# Patient Record
Sex: FEMALE | Race: WHITE | NOT HISPANIC OR LATINO | Employment: UNEMPLOYED | URBAN - NONMETROPOLITAN AREA
[De-identification: names, ages, dates, MRNs, and addresses within clinical notes are randomized per-mention and may not be internally consistent; named-entity substitution may affect disease eponyms.]

---

## 2019-11-14 ENCOUNTER — TRANSCRIBE ORDERS (OUTPATIENT)
Dept: ADMINISTRATIVE | Facility: HOSPITAL | Age: 44
End: 2019-11-14

## 2019-11-14 DIAGNOSIS — B18.2 HEPATITIS C CARRIER (HCC): ICD-10-CM

## 2022-12-11 ENCOUNTER — HOSPITAL ENCOUNTER (EMERGENCY)
Facility: HOSPITAL | Age: 47
Discharge: HOME | End: 2022-12-11
Attending: STUDENT IN AN ORGANIZED HEALTH CARE EDUCATION/TRAINING PROGRAM | Admitting: STUDENT IN AN ORGANIZED HEALTH CARE EDUCATION/TRAINING PROGRAM

## 2022-12-11 ENCOUNTER — APPOINTMENT (OUTPATIENT)
Dept: CT IMAGING | Facility: HOSPITAL | Age: 47
End: 2022-12-11

## 2022-12-11 VITALS
SYSTOLIC BLOOD PRESSURE: 139 MMHG | OXYGEN SATURATION: 99 % | DIASTOLIC BLOOD PRESSURE: 91 MMHG | RESPIRATION RATE: 14 BRPM | BODY MASS INDEX: 23.32 KG/M2 | HEART RATE: 108 BPM | WEIGHT: 140 LBS | HEIGHT: 65 IN | TEMPERATURE: 98.9 F

## 2022-12-11 DIAGNOSIS — N20.1 URETEROLITHIASIS: Primary | ICD-10-CM

## 2022-12-11 LAB
ALBUMIN SERPL-MCNC: 3.9 G/DL (ref 3.5–5.2)
ALBUMIN/GLOB SERPL: 1 G/DL
ALP SERPL-CCNC: 109 U/L (ref 39–117)
ALT SERPL W P-5'-P-CCNC: 11 U/L (ref 1–33)
ANION GAP SERPL CALCULATED.3IONS-SCNC: 10 MMOL/L (ref 5–15)
AST SERPL-CCNC: 12 U/L (ref 1–32)
B-HCG UR QL: NEGATIVE
BACTERIA UR QL AUTO: ABNORMAL /HPF
BASOPHILS # BLD AUTO: 0.07 10*3/MM3 (ref 0–0.2)
BASOPHILS NFR BLD AUTO: 0.5 % (ref 0–1.5)
BILIRUB SERPL-MCNC: <0.2 MG/DL (ref 0–1.2)
BILIRUB UR QL STRIP: NEGATIVE
BUN SERPL-MCNC: 14 MG/DL (ref 6–20)
BUN/CREAT SERPL: 14.9 (ref 7–25)
CALCIUM SPEC-SCNC: 9.6 MG/DL (ref 8.6–10.5)
CHLORIDE SERPL-SCNC: 100 MMOL/L (ref 98–107)
CLARITY UR: ABNORMAL
CO2 SERPL-SCNC: 27 MMOL/L (ref 22–29)
COLOR UR: ABNORMAL
CREAT SERPL-MCNC: 0.94 MG/DL (ref 0.57–1)
DEPRECATED RDW RBC AUTO: 48.3 FL (ref 37–54)
EGFRCR SERPLBLD CKD-EPI 2021: 76.4 ML/MIN/1.73
EOSINOPHIL # BLD AUTO: 0.22 10*3/MM3 (ref 0–0.4)
EOSINOPHIL NFR BLD AUTO: 1.6 % (ref 0.3–6.2)
ERYTHROCYTE [DISTWIDTH] IN BLOOD BY AUTOMATED COUNT: 13.5 % (ref 12.3–15.4)
GLOBULIN UR ELPH-MCNC: 3.9 GM/DL
GLUCOSE SERPL-MCNC: 89 MG/DL (ref 65–99)
GLUCOSE UR STRIP-MCNC: NEGATIVE MG/DL
HCT VFR BLD AUTO: 42 % (ref 34–46.6)
HGB BLD-MCNC: 13.3 G/DL (ref 12–15.9)
HGB UR QL STRIP.AUTO: ABNORMAL
HOLD SPECIMEN: NORMAL
HYALINE CASTS UR QL AUTO: ABNORMAL /LPF
IMM GRANULOCYTES # BLD AUTO: 0.11 10*3/MM3 (ref 0–0.05)
IMM GRANULOCYTES NFR BLD AUTO: 0.8 % (ref 0–0.5)
KETONES UR QL STRIP: ABNORMAL
LEUKOCYTE ESTERASE UR QL STRIP.AUTO: ABNORMAL
LIPASE SERPL-CCNC: 23 U/L (ref 13–60)
LYMPHOCYTES # BLD AUTO: 2.55 10*3/MM3 (ref 0.7–3.1)
LYMPHOCYTES NFR BLD AUTO: 18.7 % (ref 19.6–45.3)
MCH RBC QN AUTO: 30.6 PG (ref 26.6–33)
MCHC RBC AUTO-ENTMCNC: 31.7 G/DL (ref 31.5–35.7)
MCV RBC AUTO: 96.6 FL (ref 79–97)
MONOCYTES # BLD AUTO: 1 10*3/MM3 (ref 0.1–0.9)
MONOCYTES NFR BLD AUTO: 7.3 % (ref 5–12)
MUCOUS THREADS URNS QL MICRO: ABNORMAL /HPF
NEUTROPHILS NFR BLD AUTO: 71.1 % (ref 42.7–76)
NEUTROPHILS NFR BLD AUTO: 9.66 10*3/MM3 (ref 1.7–7)
NITRITE UR QL STRIP: NEGATIVE
NRBC BLD AUTO-RTO: 0 /100 WBC (ref 0–0.2)
PH UR STRIP.AUTO: 5.5 [PH] (ref 5–8)
PLATELET # BLD AUTO: 624 10*3/MM3 (ref 140–450)
PMV BLD AUTO: 9.2 FL (ref 6–12)
POTASSIUM SERPL-SCNC: 4.3 MMOL/L (ref 3.5–5.2)
PROT SERPL-MCNC: 7.8 G/DL (ref 6–8.5)
PROT UR QL STRIP: ABNORMAL
RBC # BLD AUTO: 4.35 10*6/MM3 (ref 3.77–5.28)
RBC # UR STRIP: ABNORMAL /HPF
REF LAB TEST METHOD: ABNORMAL
SODIUM SERPL-SCNC: 137 MMOL/L (ref 136–145)
SP GR UR STRIP: >1.03 (ref 1–1.03)
SQUAMOUS #/AREA URNS HPF: ABNORMAL /HPF
UROBILINOGEN UR QL STRIP: ABNORMAL
WBC # UR STRIP: ABNORMAL /HPF
WBC NRBC COR # BLD: 13.61 10*3/MM3 (ref 3.4–10.8)
WHOLE BLOOD HOLD COAG: NORMAL
WHOLE BLOOD HOLD SPECIMEN: NORMAL

## 2022-12-11 PROCEDURE — 81003 URINALYSIS AUTO W/O SCOPE: CPT | Performed by: STUDENT IN AN ORGANIZED HEALTH CARE EDUCATION/TRAINING PROGRAM

## 2022-12-11 PROCEDURE — 81001 URINALYSIS AUTO W/SCOPE: CPT | Performed by: STUDENT IN AN ORGANIZED HEALTH CARE EDUCATION/TRAINING PROGRAM

## 2022-12-11 PROCEDURE — 83690 ASSAY OF LIPASE: CPT | Performed by: STUDENT IN AN ORGANIZED HEALTH CARE EDUCATION/TRAINING PROGRAM

## 2022-12-11 PROCEDURE — 80053 COMPREHEN METABOLIC PANEL: CPT | Performed by: STUDENT IN AN ORGANIZED HEALTH CARE EDUCATION/TRAINING PROGRAM

## 2022-12-11 PROCEDURE — 36415 COLL VENOUS BLD VENIPUNCTURE: CPT

## 2022-12-11 PROCEDURE — 81025 URINE PREGNANCY TEST: CPT | Performed by: STUDENT IN AN ORGANIZED HEALTH CARE EDUCATION/TRAINING PROGRAM

## 2022-12-11 PROCEDURE — 99284 EMERGENCY DEPT VISIT MOD MDM: CPT

## 2022-12-11 PROCEDURE — 85025 COMPLETE CBC W/AUTO DIFF WBC: CPT | Performed by: STUDENT IN AN ORGANIZED HEALTH CARE EDUCATION/TRAINING PROGRAM

## 2022-12-11 RX ORDER — SODIUM CHLORIDE 0.9 % (FLUSH) 0.9 %
10 SYRINGE (ML) INJECTION AS NEEDED
Status: DISCONTINUED | OUTPATIENT
Start: 2022-12-11 | End: 2022-12-11 | Stop reason: HOSPADM

## 2022-12-11 RX ORDER — KETOROLAC TROMETHAMINE 10 MG/1
10 TABLET, FILM COATED ORAL EVERY 8 HOURS PRN
Qty: 15 TABLET | Refills: 0 | Status: SHIPPED | OUTPATIENT
Start: 2022-12-11 | End: 2022-12-16

## 2022-12-11 RX ORDER — ONDANSETRON 4 MG/1
4 TABLET, FILM COATED ORAL EVERY 8 HOURS PRN
COMMUNITY

## 2022-12-11 RX ORDER — TAMSULOSIN HYDROCHLORIDE 0.4 MG/1
1 CAPSULE ORAL DAILY
Qty: 30 CAPSULE | Refills: 0 | Status: SHIPPED | OUTPATIENT
Start: 2022-12-11

## 2022-12-11 RX ORDER — KETOROLAC TROMETHAMINE 10 MG/1
10 TABLET, FILM COATED ORAL ONCE
Status: COMPLETED | OUTPATIENT
Start: 2022-12-11 | End: 2022-12-11

## 2022-12-11 RX ORDER — DOLUTEGRAVIR SODIUM 50 MG/1
50 TABLET, FILM COATED ORAL
COMMUNITY

## 2022-12-11 RX ADMIN — KETOROLAC TROMETHAMINE 10 MG: 10 TABLET, FILM COATED ORAL at 14:59

## 2022-12-11 NOTE — ED PROVIDER NOTES
Subjective   History of Present Illness   Patient presents with flank and abdominal pain.  Present in the right flank and right lower abdomen.  Has been present for about 2 weeks.  She was seen at outside hospital where she was diagnosed with urinary tract infection and a large kidney stone and advised to follow-up outpatient.  She has not been able to get in with the urologist.  She has been taking her antibiotic as prescribed.  She no longer has any fevers.  She does feel the pain is migrated down the right side of her body.  No lightheadedness or syncope.  She is taking Tylenol with some relief.  Still urinating normally with no blood.    Review of Systems   Constitutional: Negative for chills and fever.   HENT: Negative for congestion and sore throat.    Eyes: Negative for pain and redness.   Respiratory: Negative for cough and shortness of breath.    Cardiovascular: Negative for chest pain and palpitations.   Gastrointestinal: Positive for abdominal pain. Negative for vomiting.   Genitourinary: Positive for flank pain. Negative for difficulty urinating.   Musculoskeletal: Negative for gait problem and joint swelling.   Skin: Negative for rash and wound.   Neurological: Negative for syncope and light-headedness.       Past Medical History:   Diagnosis Date   • HIV disease (HCC)        No Known Allergies    Past Surgical History:   Procedure Laterality Date   •  SECTION     • ENDOSCOPY  2006    gastritis       History reviewed. No pertinent family history.    Social History     Socioeconomic History   • Marital status:    Tobacco Use   • Smoking status: Never   • Smokeless tobacco: Never   Substance and Sexual Activity   • Alcohol use: Never   • Drug use: Never           Objective   Physical Exam  Vitals reviewed.   Constitutional:       General: She is not in acute distress.  HENT:      Head: Normocephalic and atraumatic.   Eyes:      Extraocular Movements: Extraocular movements intact.       Conjunctiva/sclera: Conjunctivae normal.   Cardiovascular:      Pulses: Normal pulses.      Heart sounds: Normal heart sounds.   Pulmonary:      Effort: Pulmonary effort is normal. No respiratory distress.   Abdominal:      General: Abdomen is flat. There is no distension.      Tenderness: There is no abdominal tenderness.      Comments: No CVA tenderness to percussion.   Musculoskeletal:      Cervical back: Normal range of motion and neck supple.   Skin:     General: Skin is warm and dry.   Neurological:      General: No focal deficit present.      Mental Status: She is alert. Mental status is at baseline.   Psychiatric:         Behavior: Behavior normal.         Thought Content: Thought content normal.         Procedures           ED Course                                           MDM   Susannah Garcia is a 47 y.o. female with PMH above who presents to the Emergency Department with flank/abdominal pain.  Previously diagnosed ureterolithiasis, history and physical is consistent.  Advised the patient that we can get labs and repeat CT to see if the stone is migrated, measure the size, reevaluate for hydronephrosis.  Laboratory studies obtained and are overall unremarkable without signs of infection; additionally the patient is afebrile.  Sepsis or bacteremia or infected stone are less likely at this time.  Patient had pain improvement with Toradol.  Unfortunately, she was ultimately unwilling to get the CT because it took a little bit longer than expected and she had to leave.  I do not think that this CT would be absolutely required given her history and physical; she is tolerating p.o., has a very low likelihood of other emergent intra-abdominal process such as ectopic pregnancy, appendicitis, cholecystitis, or other emergency.  Therefore we will treat outpatient with Toradol and Flomax, she agrees to strict return precautions, she received urology follow-up information and agrees to follow-up soon as possible.      Final diagnosis: renal colic    All questions answered. Patient/family was understanding and in agreement with today's assessment and plan. The patient was monitored during their stay in the ED and dispositioned without acute event.    Electronically signed by:  Nico Dunn MD 12/11/2022 17:03 CST      Note: Dragon medical dictation software was used in the creation of this note.      Final diagnoses:   Ureterolithiasis       ED Disposition  ED Disposition     ED Disposition   Discharge    Condition   Stable    Comment   --             [facility] [address], [address]  [facility] [address] [phone]             Medication List      New Prescriptions    ketorolac 10 MG tablet  Commonly known as: TORADOL  Take 1 tablet by mouth Every 8 (Eight) Hours As Needed for Moderate Pain for up to 5 days.     tamsulosin 0.4 MG capsule 24 hr capsule  Commonly known as: FLOMAX  Take 1 capsule by mouth Daily.           Where to Get Your Medications      These medications were sent to [facility] - [place], [place] - [address] [place] [address] - [phone]  - [phone]  [address] [place] [address]    Phone: [phone]   · ketorolac 10 MG tablet  · tamsulosin 0.4 MG capsule 24 hr capsule          Nico Dunn MD  12/11/22 4386

## 2023-01-05 ENCOUNTER — APPOINTMENT (OUTPATIENT)
Dept: CT IMAGING | Age: 48
End: 2023-01-05
Payer: MEDICAID

## 2023-01-05 ENCOUNTER — HOSPITAL ENCOUNTER (EMERGENCY)
Age: 48
Discharge: HOME | End: 2023-01-06
Attending: EMERGENCY MEDICINE
Payer: MEDICAID

## 2023-01-05 DIAGNOSIS — N20.0 KIDNEY STONE: ICD-10-CM

## 2023-01-05 DIAGNOSIS — N39.0 URINARY TRACT INFECTION WITHOUT HEMATURIA, SITE UNSPECIFIED: Primary | ICD-10-CM

## 2023-01-05 DIAGNOSIS — R10.9 RIGHT FLANK PAIN: ICD-10-CM

## 2023-01-05 LAB
ALBUMIN SERPL-MCNC: 4.5 G/DL (ref 3.5–5.2)
ALP BLD-CCNC: 93 U/L (ref 35–104)
ALT SERPL-CCNC: 12 U/L (ref 5–33)
ANION GAP SERPL CALCULATED.3IONS-SCNC: 14 MMOL/L (ref 7–19)
AST SERPL-CCNC: 18 U/L (ref 5–32)
BACTERIA: ABNORMAL /HPF
BASOPHILS ABSOLUTE: 0.1 K/UL (ref 0–0.2)
BASOPHILS RELATIVE PERCENT: 0.3 % (ref 0–1)
BILIRUB SERPL-MCNC: 0.9 MG/DL (ref 0.2–1.2)
BILIRUBIN URINE: NEGATIVE
BLOOD, URINE: ABNORMAL
BUN BLDV-MCNC: 18 MG/DL (ref 6–20)
CALCIUM SERPL-MCNC: 9.9 MG/DL (ref 8.6–10)
CHLORIDE BLD-SCNC: 97 MMOL/L (ref 98–111)
CLARITY: ABNORMAL
CO2: 23 MMOL/L (ref 22–29)
COLOR: ABNORMAL
CREAT SERPL-MCNC: 0.9 MG/DL (ref 0.5–0.9)
EOSINOPHILS ABSOLUTE: 0.2 K/UL (ref 0–0.6)
EOSINOPHILS RELATIVE PERCENT: 0.7 % (ref 0–5)
EPITHELIAL CELLS, UA: ABNORMAL /HPF
GFR SERPL CREATININE-BSD FRML MDRD: >60 ML/MIN/{1.73_M2}
GLUCOSE BLD-MCNC: 96 MG/DL (ref 74–109)
GLUCOSE URINE: NEGATIVE MG/DL
HCG QUALITATIVE: NEGATIVE
HCT VFR BLD CALC: 42.8 % (ref 37–47)
HEMOGLOBIN: 14.6 G/DL (ref 12–16)
IMMATURE GRANULOCYTES #: 0.2 K/UL
KETONES, URINE: ABNORMAL MG/DL
LACTIC ACID: 1.2 MMOL/L (ref 0.5–1.9)
LEUKOCYTE ESTERASE, URINE: ABNORMAL
LIPASE: 20 U/L (ref 13–60)
LYMPHOCYTES ABSOLUTE: 1.5 K/UL (ref 1.1–4.5)
LYMPHOCYTES RELATIVE PERCENT: 6.2 % (ref 20–40)
MCH RBC QN AUTO: 32.2 PG (ref 27–31)
MCHC RBC AUTO-ENTMCNC: 34.1 G/DL (ref 33–37)
MCV RBC AUTO: 94.3 FL (ref 81–99)
MONOCYTES ABSOLUTE: 0.8 K/UL (ref 0–0.9)
MONOCYTES RELATIVE PERCENT: 3.2 % (ref 0–10)
NEUTROPHILS ABSOLUTE: 21.4 K/UL (ref 1.5–7.5)
NEUTROPHILS RELATIVE PERCENT: 88.9 % (ref 50–65)
NITRITE, URINE: NEGATIVE
PDW BLD-RTO: 14.7 % (ref 11.5–14.5)
PH UA: 5.5 (ref 5–8)
PLATELET # BLD: 343 K/UL (ref 130–400)
PMV BLD AUTO: 9.7 FL (ref 9.4–12.3)
POTASSIUM SERPL-SCNC: 4.6 MMOL/L (ref 3.5–5)
PROCALCITONIN: 0.81 NG/ML (ref 0–0.09)
PROTEIN UA: 30 MG/DL
RBC # BLD: 4.54 M/UL (ref 4.2–5.4)
RBC UA: ABNORMAL /HPF (ref 0–2)
SARS-COV-2, NAAT: NOT DETECTED
SODIUM BLD-SCNC: 134 MMOL/L (ref 136–145)
SPECIFIC GRAVITY UA: 1.02 (ref 1–1.03)
TOTAL PROTEIN: 8 G/DL (ref 6.6–8.7)
UROBILINOGEN, URINE: 1 E.U./DL
WBC # BLD: 24.1 K/UL (ref 4.8–10.8)
WBC UA: ABNORMAL /HPF (ref 0–5)
YEAST: PRESENT /HPF

## 2023-01-05 PROCEDURE — 85025 COMPLETE CBC W/AUTO DIFF WBC: CPT

## 2023-01-05 PROCEDURE — 96374 THER/PROPH/DIAG INJ IV PUSH: CPT

## 2023-01-05 PROCEDURE — 87635 SARS-COV-2 COVID-19 AMP PRB: CPT

## 2023-01-05 PROCEDURE — 83605 ASSAY OF LACTIC ACID: CPT

## 2023-01-05 PROCEDURE — 6360000004 HC RX CONTRAST MEDICATION: Performed by: PHYSICIAN ASSISTANT

## 2023-01-05 PROCEDURE — 99285 EMERGENCY DEPT VISIT HI MDM: CPT

## 2023-01-05 PROCEDURE — 81001 URINALYSIS AUTO W/SCOPE: CPT

## 2023-01-05 PROCEDURE — 2580000003 HC RX 258: Performed by: PHYSICIAN ASSISTANT

## 2023-01-05 PROCEDURE — 80053 COMPREHEN METABOLIC PANEL: CPT

## 2023-01-05 PROCEDURE — 84703 CHORIONIC GONADOTROPIN ASSAY: CPT

## 2023-01-05 PROCEDURE — 84145 PROCALCITONIN (PCT): CPT

## 2023-01-05 PROCEDURE — 6360000002 HC RX W HCPCS: Performed by: PHYSICIAN ASSISTANT

## 2023-01-05 PROCEDURE — 96375 TX/PRO/DX INJ NEW DRUG ADDON: CPT

## 2023-01-05 PROCEDURE — 96361 HYDRATE IV INFUSION ADD-ON: CPT

## 2023-01-05 PROCEDURE — 36415 COLL VENOUS BLD VENIPUNCTURE: CPT

## 2023-01-05 PROCEDURE — 6370000000 HC RX 637 (ALT 250 FOR IP): Performed by: PHYSICIAN ASSISTANT

## 2023-01-05 PROCEDURE — 83690 ASSAY OF LIPASE: CPT

## 2023-01-05 PROCEDURE — 74177 CT ABD & PELVIS W/CONTRAST: CPT | Performed by: RADIOLOGY

## 2023-01-05 PROCEDURE — 74177 CT ABD & PELVIS W/CONTRAST: CPT

## 2023-01-05 PROCEDURE — 87040 BLOOD CULTURE FOR BACTERIA: CPT

## 2023-01-05 RX ORDER — FLUCONAZOLE 100 MG/1
200 TABLET ORAL ONCE
Status: COMPLETED | OUTPATIENT
Start: 2023-01-05 | End: 2023-01-05

## 2023-01-05 RX ORDER — ONDANSETRON 2 MG/ML
4 INJECTION INTRAMUSCULAR; INTRAVENOUS ONCE
Status: COMPLETED | OUTPATIENT
Start: 2023-01-05 | End: 2023-01-06

## 2023-01-05 RX ORDER — ONDANSETRON 2 MG/ML
4 INJECTION INTRAMUSCULAR; INTRAVENOUS ONCE
Status: COMPLETED | OUTPATIENT
Start: 2023-01-05 | End: 2023-01-05

## 2023-01-05 RX ORDER — ABACAVIR AND LAMIVUDINE 600; 300 MG/1; MG/1
1 TABLET, FILM COATED ORAL DAILY
COMMUNITY

## 2023-01-05 RX ORDER — HYDROMORPHONE HYDROCHLORIDE 1 MG/ML
0.5 INJECTION, SOLUTION INTRAMUSCULAR; INTRAVENOUS; SUBCUTANEOUS ONCE
Status: COMPLETED | OUTPATIENT
Start: 2023-01-05 | End: 2023-01-06

## 2023-01-05 RX ORDER — 0.9 % SODIUM CHLORIDE 0.9 %
1000 INTRAVENOUS SOLUTION INTRAVENOUS ONCE
Status: COMPLETED | OUTPATIENT
Start: 2023-01-05 | End: 2023-01-06

## 2023-01-05 RX ADMIN — IOPAMIDOL 70 ML: 755 INJECTION, SOLUTION INTRAVENOUS at 18:24

## 2023-01-05 RX ADMIN — SODIUM CHLORIDE 1000 ML: 9 INJECTION, SOLUTION INTRAVENOUS at 17:02

## 2023-01-05 RX ADMIN — ONDANSETRON 4 MG: 2 INJECTION INTRAMUSCULAR; INTRAVENOUS at 17:02

## 2023-01-05 RX ADMIN — CEFTRIAXONE 1000 MG: 1 INJECTION, POWDER, FOR SOLUTION INTRAMUSCULAR; INTRAVENOUS at 18:00

## 2023-01-05 RX ADMIN — FLUCONAZOLE 200 MG: 100 TABLET ORAL at 18:01

## 2023-01-05 ASSESSMENT — ENCOUNTER SYMPTOMS
ABDOMINAL PAIN: 1
NAUSEA: 1
EYES NEGATIVE: 1
DIARRHEA: 0
VOMITING: 1
RESPIRATORY NEGATIVE: 1

## 2023-01-05 NOTE — ED PROVIDER NOTES
[facility] EMERGENCY DEPT  EMERGENCY DEPARTMENT ENCOUNTER      Pt Name: Lacie Melton  MRN: [id number]  [place] 1975  Date of evaluation: 1/5/2023  Provider: Paula Childrses PA-C    CHIEF COMPLAINT       Chief Complaint   Patient presents with    Flank Pain     Hx of stones, bilateral flank pain, abdominal pain, back pain         HISTORY OF PRESENT ILLNESS   (Location/Symptom, Timing/Onset, Context/Setting, Quality, Duration, Modifying Factors, Severity)  Note limiting factors. HPI      Lacie Melton is a 41 y.o. female who presents to the emergency department with flank and abdominal pain. PMH significant for HIV, history of kidney stones. Patient states in November she was seen at [facility] where she was advised that she had an 11 mm kidney stone and a urinary tract infection. Patient states since this time she has had intermittent bilateral flank pain which is present daily however she has not followed up with a urologist.  Patient states last night she had abrupt onset of increased pain more notable on the right side which is now radiating towards her entire anterior abdomen with nausea and vomiting overnight as well as subjective fevers. Patient states she took a dose of ibuprofen at 6 AM this morning which only minimally and temporarily alleviated her pain. Patient denies dysuria, hematuria, or any known recent sick contact. Patient presents at this time for assistance as she is concerned her kidney stone is infected. Records reviewed show patient last seen in the ED on 12/11/2022 for ureterolithiasis. At that time patient had unremarkable lab work and improvement in her pain with Toradol at which time she left the ED without CT imaging. Patient was given a prescription for Toradol and Flomax. Patient with documented attempts of transfer from outside to [place] on 11/29/2022 as well as 12/1/2022 for urological services. Nursing Notes were reviewed.     REVIEW OF SYSTEMS    (2-9 systems for level 4, 10 or more for level 5)     Review of Systems   Constitutional:  Positive for fever (subjective). Negative for chills and diaphoresis. HENT: Negative. Eyes: Negative. Respiratory: Negative. Cardiovascular: Negative. Gastrointestinal:  Positive for abdominal pain (generalized, worse in lower), nausea and vomiting. Negative for diarrhea. Genitourinary:  Positive for flank pain (bilateral, worse on right). Negative for dysuria and hematuria. Skin: Negative. Allergic/Immunologic: Positive for immunocompromised state (HIV). Neurological: Negative. Psychiatric/Behavioral: Negative. All other systems reviewed and are negative. Except as noted above the remainder of the review of systems was reviewed and negative. PAST MEDICAL HISTORY     Past Medical History:   Diagnosis Date    HIV (human immunodeficiency virus infection) ([facility] [address]     Kidney stone          SURGICAL HISTORY       Past Surgical History:   Procedure Laterality Date     SECTION           CURRENT MEDICATIONS       Previous Medications    ABACAVIR-LAMIVUDINE (EPZICOM) 600-300 MG PER TABLET    Take 1 tablet by mouth daily    CEPHALEXIN (KEFLEX) 500 MG CAPSULE    Take 500 mg by mouth 4 times daily    DARUNAVIR ETHANOLATE (PREZISTA) 600 MG TABS    Take 600 mg by mouth daily    DOLUTEGRAVIR SODIUM (TIVICAY) 50 MG TABLET    Take 50 mg by mouth daily    EMTRICITABINE-TENOFOVIR (TRUVADA PO)    Take by mouth    RALTEGRAVIR (ISENTRESS) 400 MG TABLET    Take 400 mg by mouth 2 times daily    RITONAVIR (NORVIR) 100 MG TABLET    Take 100 mg by mouth daily       ALLERGIES     Patient has no known allergies. FAMILY HISTORY     History reviewed. No pertinent family history.        SOCIAL HISTORY       Social History     Socioeconomic History    Marital status:      Spouse name: None    Number of children: None    Years of education: None    Highest education level: None   Tobacco Use    Smoking status: Every Day     Types: Cigarettes   Vaping Use    Vaping Use: Never used   Substance and Sexual Activity    Alcohol use: Yes     Comment: PT states she is quiting     Drug use: Yes     Types: Marijuana (Weed)       SCREENINGS         [place] Coma Scale  Eye Opening: Spontaneous  Best Verbal Response: Oriented  Best Motor Response: Obeys commands  Jv Coma Scale Score: 15                     CIWA Assessment  BP: 99/69  Heart Rate: (!) 105                 PHYSICAL EXAM    (up to 7 for level 4, 8 or more for level 5)     ED Triage Vitals [01/05/23 1354]   BP Temp Temp Source Heart Rate Resp SpO2 Height Weight   112/80 98.3 °F (36.8 °C) Tympanic (!) 122 18 97 % 5' 5\" (1.651 m) 138 lb (62.6 kg)       Physical Exam  Vitals and nursing note reviewed. Constitutional:       General: She is in acute distress (appears uncomfortable due to pain). Appearance: Normal appearance. She is well-developed, well-groomed and normal weight. She is not toxic-appearing or diaphoretic. HENT:      Head: Normocephalic. Mouth/Throat:      Mouth: Mucous membranes are moist.      Pharynx: Oropharynx is clear. Eyes:      Conjunctiva/sclera: Conjunctivae normal.      Pupils: Pupils are equal, round, and reactive to light. Cardiovascular:      Rate and Rhythm: Regular rhythm. Tachycardia present. Pulmonary:      Effort: Pulmonary effort is normal.      Breath sounds: Normal breath sounds. Abdominal:      General: Bowel sounds are normal.      Palpations: Abdomen is soft. Tenderness: There is abdominal tenderness (lower). There is right CVA tenderness. There is no left CVA tenderness. Musculoskeletal:         General: Normal range of motion. Cervical back: Normal range of motion and neck supple. Right lower leg: No edema. Left lower leg: No edema. Skin:     General: Skin is warm and dry. Coloration: Skin is not jaundiced.    Neurological:      Mental Status: She is alert and oriented to person, place, and time. Gait: Gait normal.   Psychiatric:         Mood and Affect: Mood normal.         Behavior: Behavior normal. Behavior is cooperative. DIAGNOSTIC RESULTS         RADIOLOGY:   Non-plain film images such as CT, Ultrasound and MRI are read by the radiologist. Plain radiographic images are visualized and preliminarily interpreted by the emergency physician with the below findings:        Interpretation per the Radiologist below, if available at the time of this note:    CT ABDOMEN PELVIS W IV CONTRAST Additional Contrast? None    (Results Pending)         LABS:  Labs Reviewed   CBC WITH AUTO DIFFERENTIAL - Abnormal; Notable for the following components:       Result Value    WBC 24.1 (*)     MCH 32.2 (*)     RDW 14.7 (*)     Neutrophils % 88.9 (*)     Lymphocytes % 6.2 (*)     Neutrophils Absolute 21.4 (*)     All other components within normal limits   COMPREHENSIVE METABOLIC PANEL - Abnormal; Notable for the following components:    Sodium 134 (*)     Chloride 97 (*)     All other components within normal limits   URINALYSIS - Abnormal; Notable for the following components:    Color, UA DARK YELLOW (*)     Clarity, UA TURBID (*)     Ketones, Urine TRACE (*)     Blood, Urine MODERATE (*)     Protein, UA 30 (*)     Leukocyte Esterase, Urine LARGE (*)     All other components within normal limits   PROCALCITONIN - Abnormal; Notable for the following components:    Procalcitonin 0.81 (*)     All other components within normal limits   MICROSCOPIC URINALYSIS - Abnormal; Notable for the following components:    WBC, UA TNTC (*)     RBC, UA 11-15 (*)     Bacteria, UA 3+ (*)     Yeast, UA Present (*)     All other components within normal limits   CULTURE, BLOOD 1   CULTURE, BLOOD 2   LACTIC ACID   LIPASE   HCG, SERUM, QUALITATIVE       All other labs were within normal range or not returned as of this dictation.     EMERGENCY DEPARTMENT COURSE and DIFFERENTIAL DIAGNOSIS/MDM:   Vitals:    Vitals: 01/05/23 1354 01/05/23 1805   BP: 112/80 99/69   Pulse: (!) 122 (!) 105   Resp: 18 16   Temp: 98.3 °F (36.8 °C) 98.5 °F (36.9 °C)   TempSrc: Tympanic Oral   SpO2: 97% 99%   Weight: 138 lb (62.6 kg)    Height: 5' 5\" (1.651 m)            MDM     Amount and/or Complexity of Data Reviewed  Clinical lab tests: ordered and reviewed  Tests in the radiology section of CPT®: reviewed and ordered  Tests in the medicine section of CPT®: reviewed and ordered  Decide to obtain previous medical records or to obtain history from someone other than the patient: yes  Review and summarize past medical records: yes    Patient Progress  Patient progress: stable        REASSESSMENT     ED Course as of 01/05/23 1929 Thu Jan 05, 2023 1927 Work-up revealed evidence of urinary tract infection with large leukocytes, 3+ bacteria, TNTC WBC as well as yeast in the urine for which patient was given initial Rocephin and Diflucan. Concerns for development of sepsis as patient has leukocytosis of 24.1, procalcitonin of 0.1, and remained tachycardic throughout evaluation despite fluid bolus. Lactic acid, lipase, remainder of CMP are normal.  Pregnancy testing negative. Blood cultures have been obtained. [JS]   1928 Case discussed at this time with Mrs. Thorntonrichie Rishikylie WILIAM who will be assuming care of patient. Pending results of CT imaging Mrs. Javid Islas will reevaluate and disposition accordingly. Please see Mrs. Singleton's note below for further ED course as well as final diagnosis. Working diagnosis: UTI. [JS]      ED Course User Index  [JS] Reji Wilkins PA-C       CONSULTS:  None    PROCEDURES:  Unless otherwise noted below, none     Procedures            FINAL IMPRESSION      1. Urinary tract infection without hematuria, site unspecified          DISPOSITION/PLAN   DISPOSITION        PATIENT REFERRED TO:  No follow-up provider specified.     DISCHARGE MEDICATIONS:  New Prescriptions    No medications on file     Controlled Substances Monitoring:     No flowsheet data found.     (Please note that portions of this note were completed with a voice recognition program.  Efforts were made to edit the dictations but occasionally words are mis-transcribed.)    Delano Marc PA-C (electronically signed)           Delano Marc PA-C  01/05/23 1927

## 2023-01-06 VITALS
OXYGEN SATURATION: 98 % | SYSTOLIC BLOOD PRESSURE: 116 MMHG | WEIGHT: 138 LBS | TEMPERATURE: 97.4 F | HEART RATE: 90 BPM | RESPIRATION RATE: 16 BRPM | HEIGHT: 65 IN | DIASTOLIC BLOOD PRESSURE: 64 MMHG | BODY MASS INDEX: 22.99 KG/M2

## 2023-01-06 LAB
ALBUMIN SERPL-MCNC: 3.2 G/DL (ref 3.5–5.2)
ALP BLD-CCNC: 72 U/L (ref 35–104)
ALT SERPL-CCNC: 8 U/L (ref 5–33)
ANION GAP SERPL CALCULATED.3IONS-SCNC: 12 MMOL/L (ref 7–19)
AST SERPL-CCNC: 15 U/L (ref 5–32)
BASOPHILS ABSOLUTE: 0.1 K/UL (ref 0–0.2)
BASOPHILS RELATIVE PERCENT: 0.4 % (ref 0–1)
BILIRUB SERPL-MCNC: 0.4 MG/DL (ref 0.2–1.2)
BLOOD CULTURE, ROUTINE: NORMAL
BUN BLDV-MCNC: 13 MG/DL (ref 6–20)
CALCIUM SERPL-MCNC: 8.3 MG/DL (ref 8.6–10)
CHLORIDE BLD-SCNC: 104 MMOL/L (ref 98–111)
CO2: 21 MMOL/L (ref 22–29)
CREAT SERPL-MCNC: 0.7 MG/DL (ref 0.5–0.9)
CULTURE, BLOOD 2: NORMAL
EOSINOPHILS ABSOLUTE: 0.2 K/UL (ref 0–0.6)
EOSINOPHILS RELATIVE PERCENT: 1.2 % (ref 0–5)
GFR SERPL CREATININE-BSD FRML MDRD: >60 ML/MIN/{1.73_M2}
GLUCOSE BLD-MCNC: 92 MG/DL (ref 74–109)
HCT VFR BLD CALC: 34.5 % (ref 37–47)
HEMOGLOBIN: 11.1 G/DL (ref 12–16)
IMMATURE GRANULOCYTES #: 0.1 K/UL
LYMPHOCYTES ABSOLUTE: 2.1 K/UL (ref 1.1–4.5)
LYMPHOCYTES RELATIVE PERCENT: 12.4 % (ref 20–40)
MCH RBC QN AUTO: 31.7 PG (ref 27–31)
MCHC RBC AUTO-ENTMCNC: 32.2 G/DL (ref 33–37)
MCV RBC AUTO: 98.6 FL (ref 81–99)
MONOCYTES ABSOLUTE: 0.8 K/UL (ref 0–0.9)
MONOCYTES RELATIVE PERCENT: 4.7 % (ref 0–10)
NEUTROPHILS ABSOLUTE: 13.7 K/UL (ref 1.5–7.5)
NEUTROPHILS RELATIVE PERCENT: 80.7 % (ref 50–65)
PDW BLD-RTO: 15.3 % (ref 11.5–14.5)
PLATELET # BLD: 254 K/UL (ref 130–400)
PMV BLD AUTO: 9.9 FL (ref 9.4–12.3)
POTASSIUM SERPL-SCNC: 4 MMOL/L (ref 3.5–5)
RBC # BLD: 3.5 M/UL (ref 4.2–5.4)
SODIUM BLD-SCNC: 137 MMOL/L (ref 136–145)
TOTAL PROTEIN: 6.1 G/DL (ref 6.6–8.7)
WBC # BLD: 17 K/UL (ref 4.8–10.8)

## 2023-01-06 PROCEDURE — 36415 COLL VENOUS BLD VENIPUNCTURE: CPT

## 2023-01-06 PROCEDURE — 96361 HYDRATE IV INFUSION ADD-ON: CPT

## 2023-01-06 PROCEDURE — 2580000003 HC RX 258: Performed by: EMERGENCY MEDICINE

## 2023-01-06 PROCEDURE — 6360000002 HC RX W HCPCS: Performed by: NURSE PRACTITIONER

## 2023-01-06 PROCEDURE — 6360000002 HC RX W HCPCS: Performed by: EMERGENCY MEDICINE

## 2023-01-06 PROCEDURE — 96375 TX/PRO/DX INJ NEW DRUG ADDON: CPT

## 2023-01-06 PROCEDURE — 96376 TX/PRO/DX INJ SAME DRUG ADON: CPT

## 2023-01-06 PROCEDURE — 80053 COMPREHEN METABOLIC PANEL: CPT

## 2023-01-06 PROCEDURE — 85025 COMPLETE CBC W/AUTO DIFF WBC: CPT

## 2023-01-06 RX ORDER — MORPHINE SULFATE 4 MG/ML
4 INJECTION, SOLUTION INTRAMUSCULAR; INTRAVENOUS EVERY 4 HOURS PRN
Status: DISCONTINUED | OUTPATIENT
Start: 2023-01-06 | End: 2023-01-06 | Stop reason: HOSPADM

## 2023-01-06 RX ORDER — SODIUM CHLORIDE, SODIUM LACTATE, POTASSIUM CHLORIDE, CALCIUM CHLORIDE 600; 310; 30; 20 MG/100ML; MG/100ML; MG/100ML; MG/100ML
INJECTION, SOLUTION INTRAVENOUS CONTINUOUS
Status: DISCONTINUED | OUTPATIENT
Start: 2023-01-06 | End: 2023-01-06 | Stop reason: HOSPADM

## 2023-01-06 RX ORDER — SODIUM CHLORIDE 9 MG/ML
INJECTION, SOLUTION INTRAVENOUS CONTINUOUS
Status: DISCONTINUED | OUTPATIENT
Start: 2023-01-06 | End: 2023-01-06 | Stop reason: HOSPADM

## 2023-01-06 RX ADMIN — ONDANSETRON 4 MG: 2 INJECTION INTRAMUSCULAR; INTRAVENOUS at 05:39

## 2023-01-06 RX ADMIN — SODIUM CHLORIDE: 9 INJECTION, SOLUTION INTRAVENOUS at 02:02

## 2023-01-06 RX ADMIN — HYDROMORPHONE HYDROCHLORIDE 0.5 MG: 1 INJECTION, SOLUTION INTRAMUSCULAR; INTRAVENOUS; SUBCUTANEOUS at 05:39

## 2023-01-06 RX ADMIN — CEFTRIAXONE 1000 MG: 1 INJECTION, POWDER, FOR SOLUTION INTRAMUSCULAR; INTRAVENOUS at 05:40

## 2023-01-06 ASSESSMENT — PAIN SCALES - GENERAL: PAINLEVEL_OUTOF10: 5

## 2023-01-06 ASSESSMENT — PAIN - FUNCTIONAL ASSESSMENT: PAIN_FUNCTIONAL_ASSESSMENT: 0-10

## 2023-01-06 NOTE — ED PROVIDER NOTES
[facility] EMERGENCY DEPT  eMERGENCYdEPARTMENT eNCOUnter      Pt Name: Andre Land  MRN: [id number]  [place] 1975  Date of evaluation: 1/5/2023  Kaylene Kent, APRN    Emergency Department care of this patient was assumed at 1900 from [facility]. We have discussed the case and the plan of care. I have seen and evaluated patient and reviewed ED course. CHIEF COMPLAINT       Chief Complaint   Patient presents with    Flank Pain     Hx of stones, bilateral flank pain, abdominal pain, back pain         PHYSICAL EXAM    (up to 7 for level 4, 8 or more for level 5)     ED Triage Vitals [01/05/23 1354]   BP Temp Temp Source Heart Rate Resp SpO2 Height Weight   112/80 98.3 °F (36.8 °C) Tympanic (!) 122 18 97 % 5' 5\" (1.651 m) 138 lb (62.6 kg)       Physical Exam  Vitals and nursing note reviewed. Constitutional:       Appearance: Normal appearance. She is well-developed. HENT:      Head: Normocephalic and atraumatic. Eyes:      General: No scleral icterus. Right eye: No discharge. Left eye: No discharge. Cardiovascular:      Rate and Rhythm: Regular rhythm. Tachycardia present. Heart sounds: Normal heart sounds. Pulmonary:      Effort: No respiratory distress. Musculoskeletal:      Cervical back: Normal range of motion and neck supple. Skin:     General: Skin is warm and dry. Neurological:      Mental Status: She is alert and oriented to person, place, and time.    Psychiatric:         Behavior: Behavior normal.       DIAGNOSTIC RESULTS     EKG: All EKG's are interpreted by the Emergency Department Physician who either signs or Co-signs this chart inthe absence of a cardiologist.        RADIOLOGY:   Non-plain film imagessuch as CT, Ultrasound and MRI are read by the radiologist. Plain radiographic images are visualized and preliminarily interpreted by the emergency physician with the below findings:          CT ABDOMEN PELVIS W IV CONTRAST Additional Contrast? None   Final Result   Severe right-sided obstructive uropathy involving the lower half of the right   kidney secondary to 1.3x1.0 cm calculus in distal 3rd of the right ureter. The   right kidney has a double collecting system. The upper moiety demonstrate no   obstructive uropathy   This CT exam was performed using one or more of the following dose reduction   techniques: automated exposure control, adjustment of the mA and/or kV according   to patient size, and/or use of iterative reconstruction technique. LABS:  Labs Reviewed   CBC WITH AUTO DIFFERENTIAL - Abnormal; Notable for the following components:       Result Value    WBC 24.1 (*)     MCH 32.2 (*)     RDW 14.7 (*)     Neutrophils % 88.9 (*)     Lymphocytes % 6.2 (*)     Neutrophils Absolute 21.4 (*)     All other components within normal limits   COMPREHENSIVE METABOLIC PANEL - Abnormal; Notable for the following components:    Sodium 134 (*)     Chloride 97 (*)     All other components within normal limits   URINALYSIS - Abnormal; Notable for the following components:    Color, UA DARK YELLOW (*)     Clarity, UA TURBID (*)     Ketones, Urine TRACE (*)     Blood, Urine MODERATE (*)     Protein, UA 30 (*)     Leukocyte Esterase, Urine LARGE (*)     All other components within normal limits   PROCALCITONIN - Abnormal; Notable for the following components:    Procalcitonin 0.81 (*)     All other components within normal limits   MICROSCOPIC URINALYSIS - Abnormal; Notable for the following components:    WBC, UA TNTC (*)     RBC, UA 11-15 (*)     Bacteria, UA 3+ (*)     Yeast, UA Present (*)     All other components within normal limits   COVID-19, RAPID   CULTURE, BLOOD 1   CULTURE, BLOOD 2   LACTIC ACID   LIPASE   HCG, SERUM, QUALITATIVE       All other labs were within normal range or not returned as of this dictation.     EMERGENCY DEPARTMENT COURSE and DIFFERENTIAL DIAGNOSIS/MDM:   Vitals:    Vitals:    01/05/23 1354 01/05/23 1805   BP: 112/80 99/69   Pulse: (!) 122 (!) 105   Resp: 18 16   Temp: 98.3 °F (36.8 °C) 98.5 °F (36.9 °C)   TempSrc: Tympanic Oral   SpO2: 97% 99%   Weight: 138 lb (62.6 kg)    Height: 5' 5\" (1.651 m)        MDM      Reassessment  CT shows severe obstructive uropathy. We have no urology coverage. Pt has had rocephin and is resting quietly. Will need to be transferred. Transfer center has been contacted and this is pending. [address] are all at capacity at this time. Pt left in care of Dr Conor Molina. Will work on transfer again in am    CONSULTS:  None    PROCEDURES:  Unless otherwise noted below, none     Procedures    FINAL IMPRESSION      1. Urinary tract infection without hematuria, site unspecified    2. Kidney stone    3. Right flank pain          DISPOSITION/PLAN   DISPOSITION Decision To Transfer    PATIENT REFERRED TO:  No follow-up provider specified.     DISCHARGE MEDICATIONS:  New Prescriptions    No medications on file          (Please note that portions ofthis note were completed with a voice recognition program.  Efforts were made to edit the dictations but occasionally words are mis-transcribed.)    WILIAM Easley(electronically signed)  Attending Emergency Physician        WILIAM Easley  01/07/23 2959

## 2023-01-06 NOTE — ED NOTES
Alex Villalpando from [place] [address] called:     Dr. Gertrudis Amato, [facility]    Stop at registration desk for room assignment. Call report: [phone]    Make sure radiology is \"pushed to the cloud. \"     Disc requested     Santosh Galloway  01/06/23 4306

## 2023-01-06 NOTE — CARE COORDINATION
Roxanne contacted transfer center to start looking at facilities in [address] [place] [address] and in [place]

## 2023-01-06 NOTE — ED NOTES
Introduced self to pt. Pt is resting comfortably in bed. Pt given the call light and told to call if anything is needed. White board updated. Pt defers morphine at this time. Encouraged to call me if pt changes her mind.       Anabella Mohan RN  01/06/23 6950

## 2023-01-06 NOTE — ED NOTES
[facility] on standby with ETA of 9 - 10 minutes. Call when accepting info is received.   Caitlyn Perez  01/06/23 3800

## 2023-01-06 NOTE — ED NOTES
Returned Call to [facility] regarding update, spoke with Bradly and was transferred to [facility].  She was looking into other facilities and was wanting suggestions     Lily Williamson RN  01/06/23 1956 Rebecca Jeff, JENNYFER  01/06/23 1956 Rebecca Jeff RN  01/06/23 5253

## 2023-01-06 NOTE — ED PROVIDER NOTES
[facility] EMERGENCY DEPT  eMERGENCYdEPARTMENT eNCOUnter      Pt Name: Ilir Du  MRN: [id number]  [place] 1975  Date of evaluation: 1/5/2023  Carolynn Chang MD    Emergency Department care of this patient was assumed at [address] [place] [address] Rd., [address] from Dr. Jennifer Funez. We have discussed the case and the plan of care. I have seen and evaluated patient and reviewed ED course. CHIEF COMPLAINT       Chief Complaint   Patient presents with    Flank Pain     Hx of stones, bilateral flank pain, abdominal pain, back pain         PHYSICAL EXAM    (up to 7 for level 4, 8 or more for level 5)     ED Triage Vitals [01/05/23 1354]   BP Temp Temp Source Heart Rate Resp SpO2 Height Weight   112/80 98.3 °F (36.8 °C) Tympanic (!) 122 18 97 % 5' 5\" (1.651 m) 138 lb (62.6 kg)       Physical Exam    DIAGNOSTIC RESULTS         RADIOLOGY:   Non-plain film imagessuch as CT, Ultrasound and MRI are read by the radiologist. Plain radiographic images are visualized and preliminarily interpreted by the emergency physician with the below findings:        CT ABDOMEN PELVIS W IV CONTRAST Additional Contrast? None   Final Result   Severe right-sided obstructive uropathy involving the lower half of the right   kidney secondary to 1.3x1.0 cm calculus in distal 3rd of the right ureter. The   right kidney has a double collecting system. The upper moiety demonstrate no   obstructive uropathy   This CT exam was performed using one or more of the following dose reduction   techniques: automated exposure control, adjustment of the mA and/or kV according   to patient size, and/or use of iterative reconstruction technique.               LABS:  Labs Reviewed   CBC WITH AUTO DIFFERENTIAL - Abnormal; Notable for the following components:       Result Value    WBC 24.1 (*)     MCH 32.2 (*)     RDW 14.7 (*)     Neutrophils % 88.9 (*)     Lymphocytes % 6.2 (*)     Neutrophils Absolute 21.4 (*)     All other components within normal limits   COMPREHENSIVE METABOLIC PANEL - Abnormal; Notable for the following components:    Sodium 134 (*)     Chloride 97 (*)     All other components within normal limits   URINALYSIS - Abnormal; Notable for the following components:    Color, UA DARK YELLOW (*)     Clarity, UA TURBID (*)     Ketones, Urine TRACE (*)     Blood, Urine MODERATE (*)     Protein, UA 30 (*)     Leukocyte Esterase, Urine LARGE (*)     All other components within normal limits   PROCALCITONIN - Abnormal; Notable for the following components:    Procalcitonin 0.81 (*)     All other components within normal limits   MICROSCOPIC URINALYSIS - Abnormal; Notable for the following components:    WBC, UA TNTC (*)     RBC, UA 11-15 (*)     Bacteria, UA 3+ (*)     Yeast, UA Present (*)     All other components within normal limits   COMPREHENSIVE METABOLIC PANEL - Abnormal; Notable for the following components:    CO2 21 (*)     Calcium 8.3 (*)     Total Protein 6.1 (*)     Albumin 3.2 (*)     All other components within normal limits   CBC WITH AUTO DIFFERENTIAL - Abnormal; Notable for the following components:    WBC 17.0 (*)     RBC 3.50 (*)     Hemoglobin 11.1 (*)     Hematocrit 34.5 (*)     MCH 31.7 (*)     MCHC 32.2 (*)     RDW 15.3 (*)     Neutrophils % 80.7 (*)     Lymphocytes % 12.4 (*)     Neutrophils Absolute 13.7 (*)     All other components within normal limits   COVID-19, RAPID   CULTURE, BLOOD 1   CULTURE, BLOOD 2   LACTIC ACID   LIPASE   HCG, SERUM, QUALITATIVE       All other labs were within normal range or not returned as of this dictation. EMERGENCY DEPARTMENT COURSE and DIFFERENTIAL DIAGNOSIS/MDM:   Vitals:    Vitals:    01/05/23 1805 01/06/23 0206 01/06/23 0553 01/06/23 0805   BP: 99/69 118/70 116/63 112/71   Pulse: (!) 105 96 98 94   Resp: 16 20 20 15   Temp: 98.5 °F (36.9 °C)   97.5 °F (36.4 °C)   TempSrc: Oral   Oral   SpO2: 99% 96% 94% 98%   Weight:       Height:           MDM      Pt remains stable during my care.   She has been accepted to [facility] by  [facility]. D/w Dr. Rona Bennett on phone. Awaiting bed to be clean and will arrange transport. CONSULTS:  None    PROCEDURES:  Unless otherwise noted below, none     Procedures    FINAL IMPRESSION      1. Urinary tract infection without hematuria, site unspecified    2. Kidney stone    3. Right flank pain          DISPOSITION/PLAN   DISPOSITION Decision To Transfer    PATIENT REFERRED TO:  No follow-up provider specified.     DISCHARGE MEDICATIONS:  New Prescriptions    No medications on file          (Please note that portions ofthis note were completed with a voice recognition program.  Efforts were made to edit the dictations but occasionally words are mis-transcribed.)    Frances Mesa MD(electronically signed)  Attending Emergency Physician         Charla Tong MD  01/06/23 8507

## 2023-01-06 NOTE — ED NOTES
Parvez Manning at [facility]. She checked with [address] [place] [address] in [place], [place]. They did take the information, but they are at capacity. Will call when they have availability. Could be \"a while. \"      Lauren Johns  01/06/23 3954

## 2023-01-10 LAB
BLOOD CULTURE, ROUTINE: NORMAL
CULTURE, BLOOD 2: NORMAL